# Patient Record
Sex: FEMALE | Race: WHITE | NOT HISPANIC OR LATINO | URBAN - METROPOLITAN AREA
[De-identification: names, ages, dates, MRNs, and addresses within clinical notes are randomized per-mention and may not be internally consistent; named-entity substitution may affect disease eponyms.]

---

## 2017-12-20 ENCOUNTER — APPOINTMENT (OUTPATIENT)
Dept: URBAN - METROPOLITAN AREA CLINIC 219 | Age: 77
Setting detail: DERMATOLOGY
End: 2017-12-21

## 2017-12-20 DIAGNOSIS — L81.7 PIGMENTED PURPURIC DERMATOSIS: ICD-10-CM

## 2017-12-20 DIAGNOSIS — L30.8 OTHER SPECIFIED DERMATITIS: ICD-10-CM

## 2017-12-20 DIAGNOSIS — L72.0 EPIDERMAL CYST: ICD-10-CM

## 2017-12-20 DIAGNOSIS — L82.0 INFLAMED SEBORRHEIC KERATOSIS: ICD-10-CM

## 2017-12-20 PROBLEM — M12.9 ARTHROPATHY, UNSPECIFIED: Status: ACTIVE | Noted: 2017-12-20

## 2017-12-20 PROCEDURE — OTHER COUNSELING: OTHER

## 2017-12-20 PROCEDURE — OTHER LIQUID NITROGEN: OTHER

## 2017-12-20 PROCEDURE — OTHER PRESCRIPTION: OTHER

## 2017-12-20 PROCEDURE — OTHER INCISION AND DRAINAGE: OTHER

## 2017-12-20 PROCEDURE — OTHER OTHER: OTHER

## 2017-12-20 PROCEDURE — 10061 I&D ABSCESS COMP/MULTIPLE: CPT

## 2017-12-20 PROCEDURE — OTHER TREATMENT REGIMEN: OTHER

## 2017-12-20 PROCEDURE — 17110 DESTRUCT B9 LESION 1-14: CPT

## 2017-12-20 PROCEDURE — 99214 OFFICE O/P EST MOD 30 MIN: CPT | Mod: 25

## 2017-12-20 RX ORDER — TRIAMCINOLONE ACETONIDE 1 MG/G
APPLY OINTMENT TOPICAL AS DIRECTED
Qty: 1 | Refills: 5 | Status: ERX

## 2017-12-20 RX ORDER — MUPIROCIN 20 MG/G
APPLY OINTMENT TOPICAL TWICE A DAY
Qty: 1 | Refills: 3 | Status: ERX | COMMUNITY
Start: 2017-12-20

## 2017-12-20 ASSESSMENT — LOCATION DETAILED DESCRIPTION DERM
LOCATION DETAILED: RIGHT ANTERIOR PROXIMAL THIGH
LOCATION DETAILED: LEFT DISTAL PRETIBIAL REGION
LOCATION DETAILED: RIGHT DISTAL PRETIBIAL REGION
LOCATION DETAILED: LEFT MID-UPPER BACK

## 2017-12-20 ASSESSMENT — LOCATION SIMPLE DESCRIPTION DERM
LOCATION SIMPLE: LEFT UPPER BACK
LOCATION SIMPLE: RIGHT PRETIBIAL REGION
LOCATION SIMPLE: LEFT PRETIBIAL REGION
LOCATION SIMPLE: RIGHT THIGH

## 2017-12-20 ASSESSMENT — LOCATION ZONE DERM
LOCATION ZONE: TRUNK
LOCATION ZONE: LEG

## 2017-12-20 NOTE — PROCEDURE: TREATMENT REGIMEN
Continue Regimen: Triamcinolone 0.1% Ointment 1-2 times a week as needed for flares\\nEmollients ( Cerave Cream)  \\nMild Cleansers
Plan: Triamcinolone 0.1%  Ointment 1-2 times a week as needed
Detail Level: Zone
Detail Level: Simple

## 2017-12-20 NOTE — PROCEDURE: INCISION AND DRAINAGE
Wound Care: Mupirocin
Include Sutures?: No
Epidermal Closure: simple interrupted
Size Of Lesion In Cm (Optional But May Be Required For Some Insurances): 2
I&D Type: complicated
Epidermal Sutures: 4-0 Ethilon
Dressing: dry sterile dressing
Body Location Override (Optional - Billing Will Still Be Based On Selected Body Map Location If Applicable): right thigh
Detail Level: Detailed
Lesion Type: Cyst
Anesthesia Type: 1% lidocaine with epinephrine and a 1:10 solution of 8.4% sodium bicarbonate
Suture Text: The incision was partially closed with
Curette: Yes
Post-Care Instructions: I reviewed with the patient in detail post-care instructions. Patient should keep wound covered and call the office should any redness, pain, swelling or worsening occur.
Consent was obtained and risks were reviewed including but not limited to delayed wound healing, infection, need for multiple I and D's, and pain.
Curette Text (Optional): After the contents were expressed a curette was used to partially remove the cyst wall.
Anesthesia Volume In Cc: 3
Method: 15 blade

## 2017-12-20 NOTE — PROCEDURE: OTHER
Detail Level: Simple
Other (Free Text): Patient to call for overbook appointment if worsens or no improvement
Note Text (......Xxx Chief Complaint.): This diagnosis correlates with the

## 2017-12-20 NOTE — PROCEDURE: LIQUID NITROGEN
Detail Level: Detailed
Post-Care Instructions: I reviewed with the patient in detail post-care instructions. Patient is to wear sunprotection, and avoid picking at any of the treated lesions. Pt may apply Vaseline to crusted or scabbing areas.
Medical Necessity Information: It is in your best interest to select a reason for this procedure from the list below. All of these items fulfill various CMS LCD requirements except the new and changing color options.
Consent: The patient's consent was obtained including but not limited to risks of crusting, scabbing, blistering, scarring, darker or lighter pigmentary change, recurrence, incomplete removal and infection.
Add 52 Modifier (Optional): no
Medical Necessity Clause: This procedure was medically necessary because the lesions that were treated were:

## 2018-06-26 ENCOUNTER — APPOINTMENT (OUTPATIENT)
Dept: URBAN - METROPOLITAN AREA CLINIC 219 | Age: 78
Setting detail: DERMATOLOGY
End: 2018-06-26

## 2018-06-26 DIAGNOSIS — L29.8 OTHER PRURITUS: ICD-10-CM

## 2018-06-26 DIAGNOSIS — L82.0 INFLAMED SEBORRHEIC KERATOSIS: ICD-10-CM

## 2018-06-26 DIAGNOSIS — L30.8 OTHER SPECIFIED DERMATITIS: ICD-10-CM

## 2018-06-26 DIAGNOSIS — L57.0 ACTINIC KERATOSIS: ICD-10-CM

## 2018-06-26 DIAGNOSIS — L82.1 OTHER SEBORRHEIC KERATOSIS: ICD-10-CM

## 2018-06-26 DIAGNOSIS — L81.7 PIGMENTED PURPURIC DERMATOSIS: ICD-10-CM

## 2018-06-26 PROCEDURE — OTHER MIPS QUALITY: OTHER

## 2018-06-26 PROCEDURE — OTHER REASSURANCE: OTHER

## 2018-06-26 PROCEDURE — 17003 DESTRUCT PREMALG LES 2-14: CPT

## 2018-06-26 PROCEDURE — 99214 OFFICE O/P EST MOD 30 MIN: CPT | Mod: 25

## 2018-06-26 PROCEDURE — 17110 DESTRUCT B9 LESION 1-14: CPT

## 2018-06-26 PROCEDURE — OTHER TREATMENT REGIMEN: OTHER

## 2018-06-26 PROCEDURE — 17000 DESTRUCT PREMALG LESION: CPT | Mod: 59

## 2018-06-26 PROCEDURE — OTHER LIQUID NITROGEN: OTHER

## 2018-06-26 ASSESSMENT — LOCATION DETAILED DESCRIPTION DERM
LOCATION DETAILED: RIGHT PROXIMAL POSTERIOR UPPER ARM
LOCATION DETAILED: LEFT MEDIAL DISTAL PRETIBIAL REGION
LOCATION DETAILED: LEFT DISTAL POSTERIOR UPPER ARM
LOCATION DETAILED: RIGHT MEDIAL UPPER BACK
LOCATION DETAILED: RIGHT DISTAL PRETIBIAL REGION
LOCATION DETAILED: LEFT DISTAL PRETIBIAL REGION

## 2018-06-26 ASSESSMENT — LOCATION SIMPLE DESCRIPTION DERM
LOCATION SIMPLE: RIGHT UPPER BACK
LOCATION SIMPLE: LEFT POSTERIOR UPPER ARM
LOCATION SIMPLE: LEFT PRETIBIAL REGION
LOCATION SIMPLE: RIGHT POSTERIOR UPPER ARM
LOCATION SIMPLE: RIGHT PRETIBIAL REGION

## 2018-06-26 ASSESSMENT — LOCATION ZONE DERM
LOCATION ZONE: TRUNK
LOCATION ZONE: LEG
LOCATION ZONE: ARM

## 2018-06-26 NOTE — PROCEDURE: LIQUID NITROGEN
Post-Care Instructions: I reviewed with the patient in detail post-care instructions. Patient is to wear sunprotection, and avoid picking at any of the treated lesions. Pt may apply Vaseline to crusted or scabbing areas.
Render Post-Care Instructions In Note?: no
Detail Level: Detailed
Medical Necessity Clause: This procedure was medically necessary because the lesions that were treated were:
Duration Of Freeze Thaw-Cycle (Seconds): 0
Consent: The patient's consent was obtained including but not limited to risks of crusting, scabbing, blistering, scarring, darker or lighter pigmentary change, recurrence, incomplete removal and infection.
Medical Necessity Information: It is in your best interest to select a reason for this procedure from the list below. All of these items fulfill various CMS LCD requirements except the new and changing color options.

## 2018-06-26 NOTE — PROCEDURE: TREATMENT REGIMEN
Continue Regimen: Triamcinolone  0.1%  Ointment 1-2 times a week as needed
Detail Level: Zone
Continue Regimen: Triamcinolone 0.1% Ointment 1-2 times a week as needed for flares\\nEmollients ( Cerave Cream)  \\nMild Cleansers
Detail Level: Simple
Plan: Selsun Blue Moisturizing shampoo with aloe 2-3 times a week

## 2018-10-02 ENCOUNTER — APPOINTMENT (OUTPATIENT)
Dept: URBAN - METROPOLITAN AREA CLINIC 219 | Age: 78
Setting detail: DERMATOLOGY
End: 2018-10-02

## 2018-10-02 DIAGNOSIS — L82.1 OTHER SEBORRHEIC KERATOSIS: ICD-10-CM

## 2018-10-02 DIAGNOSIS — L72.8 OTHER FOLLICULAR CYSTS OF THE SKIN AND SUBCUTANEOUS TISSUE: ICD-10-CM

## 2018-10-02 DIAGNOSIS — L82.0 INFLAMED SEBORRHEIC KERATOSIS: ICD-10-CM

## 2018-10-02 PROCEDURE — 17110 DESTRUCT B9 LESION 1-14: CPT

## 2018-10-02 PROCEDURE — 10060 I&D ABSCESS SIMPLE/SINGLE: CPT

## 2018-10-02 PROCEDURE — OTHER INCISION AND DRAINAGE: OTHER

## 2018-10-02 PROCEDURE — 99212 OFFICE O/P EST SF 10 MIN: CPT | Mod: 25

## 2018-10-02 PROCEDURE — OTHER REASSURANCE: OTHER

## 2018-10-02 PROCEDURE — OTHER LIQUID NITROGEN: OTHER

## 2018-10-02 ASSESSMENT — LOCATION DETAILED DESCRIPTION DERM
LOCATION DETAILED: RIGHT SUPERIOR LATERAL MALAR CHEEK
LOCATION DETAILED: RIGHT MEDIAL SUPERIOR CHEST
LOCATION DETAILED: LEFT CHIN
LOCATION DETAILED: SUPERIOR THORACIC SPINE
LOCATION DETAILED: LEFT LATERAL SUPERIOR CHEST
LOCATION DETAILED: RIGHT CLAVICULAR SKIN
LOCATION DETAILED: RIGHT ANTERIOR SHOULDER
LOCATION DETAILED: RIGHT LATERAL TEMPLE
LOCATION DETAILED: LEFT SUPERIOR POSTAURICULAR SKIN
LOCATION DETAILED: LEFT MEDIAL SUPERIOR CHEST
LOCATION DETAILED: RIGHT LATERAL SUPERIOR CHEST
LOCATION DETAILED: RIGHT CLAVICULAR NECK

## 2018-10-02 ASSESSMENT — LOCATION SIMPLE DESCRIPTION DERM
LOCATION SIMPLE: RIGHT ANTERIOR NECK
LOCATION SIMPLE: RIGHT CHEEK
LOCATION SIMPLE: SCALP
LOCATION SIMPLE: UPPER BACK
LOCATION SIMPLE: RIGHT TEMPLE
LOCATION SIMPLE: RIGHT SHOULDER
LOCATION SIMPLE: CHEST
LOCATION SIMPLE: CHIN
LOCATION SIMPLE: RIGHT CLAVICULAR SKIN

## 2018-10-02 ASSESSMENT — LOCATION ZONE DERM
LOCATION ZONE: SCALP
LOCATION ZONE: FACE
LOCATION ZONE: TRUNK
LOCATION ZONE: NECK
LOCATION ZONE: ARM

## 2018-10-02 NOTE — PROCEDURE: LIQUID NITROGEN
Medical Necessity Clause: This procedure was medically necessary because the lesions that were treated were:
Post-Care Instructions: I reviewed with the patient in detail post-care instructions. Patient is to wear sunprotection, and avoid picking at any of the treated lesions. Pt may apply Vaseline to crusted or scabbing areas.
Render Post-Care Instructions In Note?: no
Detail Level: Detailed
Medical Necessity Information: It is in your best interest to select a reason for this procedure from the list below. All of these items fulfill various CMS LCD requirements except the new and changing color options.
Consent: The patient's consent was obtained including but not limited to risks of crusting, scabbing, blistering, scarring, darker or lighter pigmentary change, recurrence, incomplete removal and infection.

## 2018-10-02 NOTE — PROCEDURE: INCISION AND DRAINAGE
Consent was obtained and risks were reviewed including but not limited to delayed wound healing, infection, need for multiple I and D's, and pain.
Suture Text: The incision was partially closed with
Wound Care: Mupirocin
Method: 11 blade
Include Sutures?: No
Anesthesia Volume In Cc: 0.5
Size Of Lesion In Cm (Optional But May Be Required For Some Insurances): 0.3
Preparation Text: The area was prepped in the usual clean fashion.
Body Location Override (Optional - Billing Will Still Be Based On Selected Body Map Location If Applicable): right cheek
Post-Care Instructions: I reviewed with the patient in detail post-care instructions. Patient should keep wound covered and call the office should any redness, pain, swelling or worsening occur.
Body Location Override (Optional - Billing Will Still Be Based On Selected Body Map Location If Applicable): left chin
Detail Level: Detailed
Dressing: no dressing
Epidermal Closure: simple interrupted
Epidermal Sutures: 4-0 Ethilon
Curette Text (Optional): After the contents were expressed a curette was used to partially remove the cyst wall.
Lesion Type: Cyst

## 2018-10-23 ENCOUNTER — APPOINTMENT (OUTPATIENT)
Dept: URBAN - METROPOLITAN AREA CLINIC 219 | Age: 78
Setting detail: DERMATOLOGY
End: 2018-10-23

## 2018-10-23 DIAGNOSIS — L81.7 PIGMENTED PURPURIC DERMATOSIS: ICD-10-CM

## 2018-10-23 PROCEDURE — OTHER TREATMENT REGIMEN: OTHER

## 2018-10-23 PROCEDURE — 99212 OFFICE O/P EST SF 10 MIN: CPT

## 2018-10-23 PROCEDURE — OTHER COUNSELING: OTHER

## 2018-10-23 PROCEDURE — OTHER MIPS QUALITY: OTHER

## 2018-10-23 ASSESSMENT — LOCATION ZONE DERM: LOCATION ZONE: LEG

## 2018-10-23 ASSESSMENT — LOCATION SIMPLE DESCRIPTION DERM: LOCATION SIMPLE: RIGHT PRETIBIAL REGION

## 2018-10-23 ASSESSMENT — LOCATION DETAILED DESCRIPTION DERM: LOCATION DETAILED: RIGHT DISTAL PRETIBIAL REGION

## 2018-10-23 NOTE — PROCEDURE: TREATMENT REGIMEN
Plan: Complete the Keflex course ( patient currently taking a 7 day course ) \\nCan cont Compression stockings
Detail Level: Simple

## 2019-01-03 ENCOUNTER — APPOINTMENT (OUTPATIENT)
Dept: URBAN - METROPOLITAN AREA CLINIC 219 | Age: 79
Setting detail: DERMATOLOGY
End: 2019-01-03

## 2019-01-03 DIAGNOSIS — L72.0 EPIDERMAL CYST: ICD-10-CM

## 2019-01-03 DIAGNOSIS — L57.0 ACTINIC KERATOSIS: ICD-10-CM

## 2019-01-03 PROBLEM — L85.3 XEROSIS CUTIS: Status: ACTIVE | Noted: 2019-01-03

## 2019-01-03 PROBLEM — E78.5 HYPERLIPIDEMIA, UNSPECIFIED: Status: ACTIVE | Noted: 2019-01-03

## 2019-01-03 PROCEDURE — 99212 OFFICE O/P EST SF 10 MIN: CPT | Mod: 25

## 2019-01-03 PROCEDURE — 17003 DESTRUCT PREMALG LES 2-14: CPT

## 2019-01-03 PROCEDURE — 17000 DESTRUCT PREMALG LESION: CPT

## 2019-01-03 PROCEDURE — OTHER COUNSELING: OTHER

## 2019-01-03 PROCEDURE — OTHER LIQUID NITROGEN: OTHER

## 2019-01-03 ASSESSMENT — LOCATION ZONE DERM
LOCATION ZONE: NECK
LOCATION ZONE: FACE

## 2019-01-03 ASSESSMENT — LOCATION SIMPLE DESCRIPTION DERM
LOCATION SIMPLE: LEFT FOREHEAD
LOCATION SIMPLE: NECK
LOCATION SIMPLE: POSTERIOR NECK

## 2019-01-03 ASSESSMENT — LOCATION DETAILED DESCRIPTION DERM
LOCATION DETAILED: LEFT LATERAL TRAPEZIAL NECK
LOCATION DETAILED: RIGHT MEDIAL TRAPEZIAL NECK
LOCATION DETAILED: RIGHT SUPERIOR POSTERIOR NECK
LOCATION DETAILED: LEFT MEDIAL TRAPEZIAL NECK
LOCATION DETAILED: LEFT INFERIOR POSTERIOR NECK
LOCATION DETAILED: RIGHT CENTRAL POSTERIOR NECK
LOCATION DETAILED: LEFT INFERIOR FOREHEAD

## 2019-01-03 NOTE — HPI: OTHER
Condition:: Would like her face checked
Please Describe Your Condition:: Patient states she would like her face checked today.

## 2020-04-07 ENCOUNTER — APPOINTMENT (OUTPATIENT)
Dept: URBAN - NONMETROPOLITAN AREA CLINIC 45 | Age: 80
Setting detail: DERMATOLOGY
End: 2020-04-07

## 2020-04-07 DIAGNOSIS — I87.2 VENOUS INSUFFICIENCY (CHRONIC) (PERIPHERAL): ICD-10-CM

## 2020-04-07 PROBLEM — I48.91 UNSPECIFIED ATRIAL FIBRILLATION: Status: ACTIVE | Noted: 2020-04-07

## 2020-04-07 PROBLEM — L29.8 OTHER PRURITUS: Status: ACTIVE | Noted: 2020-04-07

## 2020-04-07 PROCEDURE — OTHER TREATMENT REGIMEN: OTHER

## 2020-04-07 PROCEDURE — OTHER COUNSELING: OTHER

## 2020-04-07 PROCEDURE — 99213 OFFICE O/P EST LOW 20 MIN: CPT | Mod: 95

## 2020-04-07 PROCEDURE — OTHER MIPS QUALITY: OTHER

## 2020-04-07 PROCEDURE — OTHER REASON FOR TELEMEDICINE VISIT: OTHER

## 2020-04-07 ASSESSMENT — LOCATION DETAILED DESCRIPTION DERM: LOCATION DETAILED: RIGHT DISTAL PRETIBIAL REGION

## 2020-04-07 ASSESSMENT — LOCATION ZONE DERM: LOCATION ZONE: LEG

## 2020-04-07 ASSESSMENT — LOCATION SIMPLE DESCRIPTION DERM: LOCATION SIMPLE: RIGHT PRETIBIAL REGION

## 2020-04-07 NOTE — PROCEDURE: TREATMENT REGIMEN
Detail Level: Simple
Plan: Recommend compression hose daily \\nPatient to keep legs elevated while seated \\nWash with Dove bar soap\\nTriamcinolone ointment twice a day x2 weeks then 1-2 times a week as needed then change to \\nCetaphil or Cerave cream as moisturizer

## 2020-06-23 ENCOUNTER — OFFICE VISIT (OUTPATIENT)
Dept: URBAN - NONMETROPOLITAN AREA CLINIC 2 | Facility: CLINIC | Age: 80
End: 2020-06-23
Payer: COMMERCIAL

## 2020-06-23 DIAGNOSIS — Z12.11 COLON CANCER SCREENING: ICD-10-CM

## 2020-06-23 DIAGNOSIS — R19.7 DIARRHEA: ICD-10-CM

## 2020-06-23 DIAGNOSIS — R14.0 BLOATING: ICD-10-CM

## 2020-06-23 PROCEDURE — G8417 CALC BMI ABV UP PARAM F/U: HCPCS | Performed by: INTERNAL MEDICINE

## 2020-06-23 PROCEDURE — 99214 OFFICE O/P EST MOD 30 MIN: CPT | Performed by: INTERNAL MEDICINE

## 2020-06-23 PROCEDURE — G9903 PT SCRN TBCO ID AS NON USER: HCPCS | Performed by: INTERNAL MEDICINE

## 2020-06-23 PROCEDURE — G8427 DOCREV CUR MEDS BY ELIG CLIN: HCPCS | Performed by: INTERNAL MEDICINE

## 2020-06-23 RX ORDER — SIMVASTATIN 10 MG/1
TAKE 1 TABLET (10 MG) BY ORAL ROUTE ONCE DAILY IN THE EVENING TABLET, FILM COATED ORAL 1
Qty: 0 | Refills: 0 | Status: ACTIVE | COMMUNITY
Start: 1900-01-01

## 2020-06-23 RX ORDER — RIFAXIMIN 550 MG/1
1 TABLET TABLET ORAL THREE TIMES A DAY
Qty: 42 TABLET | Refills: 3 | OUTPATIENT
Start: 2020-06-23 | End: 2020-08-18

## 2020-06-23 RX ORDER — LOSARTAN POTASSIUM 25 MG/1
TAKE 1 TABLET (25 MG) BY ORAL ROUTE ONCE DAILY TABLET, FILM COATED ORAL 1
Qty: 0 | Refills: 0 | Status: ACTIVE | COMMUNITY
Start: 1900-01-01

## 2020-06-23 RX ORDER — TRIAMTERENE AND HYDROCHLOROTHIAZIDE 37.5; 25 MG/1; MG/1
TABLET ORAL
Qty: 0 | Refills: 0 | Status: ACTIVE | COMMUNITY
Start: 1900-01-01

## 2020-06-23 RX ORDER — WARFARIN 5 MG/1
TABLET ORAL
Qty: 0 | Refills: 0 | Status: ACTIVE | COMMUNITY
Start: 1900-01-01

## 2020-06-23 RX ORDER — SITAGLIPTIN AND METFORMIN HYDROCHLORIDE 50; 1000 MG/1; MG/1
TAKE 1 TABLET BY ORAL ROUTE 2 TIMES PER DAY WITH MEALS TABLET, FILM COATED ORAL 2
Qty: 0 | Refills: 0 | Status: ACTIVE | COMMUNITY
Start: 1900-01-01

## 2020-06-23 RX ORDER — OXYBUTYNIN CHLORIDE 10 MG/1
TAKE 1 TABLET (10 MG) BY ORAL ROUTE ONCE DAILY TABLET, EXTENDED RELEASE ORAL 1
Qty: 0 | Refills: 0 | Status: ACTIVE | COMMUNITY
Start: 1900-01-01

## 2020-06-23 RX ORDER — DILTIAZEM HYDROCHLORIDE 120 MG/1
TAKE 1 CAPSULE (120 MG) BY ORAL ROUTE ONCE DAILY CAPSULE, EXTENDED RELEASE ORAL 1
Qty: 0 | Refills: 0 | Status: ACTIVE | COMMUNITY
Start: 1900-01-01

## 2020-06-23 RX ORDER — ATENOLOL 50 MG/1
TAKE 1 TABLET (50 MG) BY ORAL ROUTE ONCE DAILY TABLET ORAL 1
Qty: 0 | Refills: 0 | Status: ACTIVE | COMMUNITY
Start: 1900-01-01

## 2020-06-23 RX ORDER — BIFIDOBACTERIUM LONGUM 10MM CELL
TAKE 1 CAPSULE BY ORAL ROUTE DAILY CAPSULE ORAL 1
Qty: 0 | Refills: 0 | Status: ACTIVE | COMMUNITY
Start: 1900-01-01

## 2020-06-23 NOTE — HPI-TODAY'S VISIT:
12/19/19  Mrs. Pleitez presents for endoscopy follow up. Her labs show elevated celiac titer. Her EGD shows normal SBBX and her colonoscopy reveals normal random biopsies and diverticular disease. She is on low dose metamucil and align daily, she thinks this works better than the florastor. She still has urgency and diarrhea with bloating. She has failed immodium in the past. She does want to try a gluten free diet given her history. Today she is doing well otherwise. MB   6/23/20 The patient presents today for follow-up of her diarrhea and urgency.  This has been much better on a gluten-free diet.  Debra did treat her with Xifaxan at her last visit.  Her main GI complaint today is of increased gas and bloating.  She denies any artificial sweeteners.  She does take a probiotic daily.  She does not feel that she is constipated.  She is unsure if the Xifaxan helped significantly with the gas and bloating, but it has gotten worse since her last visit.  Overall, she is feeling well today.

## 2020-07-02 ENCOUNTER — APPOINTMENT (OUTPATIENT)
Dept: URBAN - NONMETROPOLITAN AREA CLINIC 45 | Age: 80
Setting detail: DERMATOLOGY
End: 2020-07-02

## 2020-07-02 DIAGNOSIS — L57.8 OTHER SKIN CHANGES DUE TO CHRONIC EXPOSURE TO NONIONIZING RADIATION: ICD-10-CM

## 2020-07-02 DIAGNOSIS — H01.00 UNSPECIFIED BLEPHARITIS: ICD-10-CM

## 2020-07-02 DIAGNOSIS — L82.1 OTHER SEBORRHEIC KERATOSIS: ICD-10-CM

## 2020-07-02 PROBLEM — D48.5 NEOPLASM OF UNCERTAIN BEHAVIOR OF SKIN: Status: ACTIVE | Noted: 2020-07-02

## 2020-07-02 PROBLEM — L85.3 XEROSIS CUTIS: Status: ACTIVE | Noted: 2020-07-02

## 2020-07-02 PROBLEM — H01.001 UNSPECIFIED BLEPHARITIS RIGHT UPPER EYELID: Status: ACTIVE | Noted: 2020-07-02

## 2020-07-02 PROBLEM — E13.9 OTHER SPECIFIED DIABETES MELLITUS WITHOUT COMPLICATIONS: Status: ACTIVE | Noted: 2020-07-02

## 2020-07-02 PROBLEM — M12.9 ARTHROPATHY, UNSPECIFIED: Status: ACTIVE | Noted: 2020-07-02

## 2020-07-02 PROBLEM — H01.004 UNSPECIFIED BLEPHARITIS LEFT UPPER EYELID: Status: ACTIVE | Noted: 2020-07-02

## 2020-07-02 PROCEDURE — OTHER REASSURANCE: OTHER

## 2020-07-02 PROCEDURE — OTHER COUNSELING: OTHER

## 2020-07-02 PROCEDURE — OTHER TREATMENT REGIMEN: OTHER

## 2020-07-02 PROCEDURE — 99213 OFFICE O/P EST LOW 20 MIN: CPT

## 2020-07-02 ASSESSMENT — LOCATION ZONE DERM
LOCATION ZONE: NOSE
LOCATION ZONE: EYELID
LOCATION ZONE: FACE

## 2020-07-02 ASSESSMENT — LOCATION SIMPLE DESCRIPTION DERM
LOCATION SIMPLE: RIGHT SUPERIOR EYELID
LOCATION SIMPLE: LEFT CHEEK
LOCATION SIMPLE: NOSE
LOCATION SIMPLE: LEFT SUPERIOR EYELID

## 2020-07-02 ASSESSMENT — LOCATION DETAILED DESCRIPTION DERM
LOCATION DETAILED: LEFT INFERIOR CENTRAL MALAR CHEEK
LOCATION DETAILED: RIGHT LATERAL SUPERIOR EYELID
LOCATION DETAILED: NASAL DORSUM
LOCATION DETAILED: LEFT LATERAL SUPERIOR EYELID
LOCATION DETAILED: LEFT LATERAL MALAR CHEEK

## 2020-07-02 NOTE — PROCEDURE: TREATMENT REGIMEN
Plan: Clean eyelids twice a day with diluted baby shampoo
Detail Level: Simple
Plan: Murad Rapid Age Spot and Pigment Lightening Serum every morning to dark spots as needed (Patient already has)\\nNeutrogena Rapid Wrinkle Repair Regenerating Cream at night as tolerated (Patient already has)\\nSunscreen daily

## 2020-07-02 NOTE — HPI: OTHER
Condition:: Scaling upper and lower lash line
Please Describe Your Condition:: is being seen for a chief complaint of Scaling upper and lower lash line . Denies any irritation

## 2020-07-15 ENCOUNTER — TELEPHONE ENCOUNTER (OUTPATIENT)
Dept: URBAN - NONMETROPOLITAN AREA CLINIC 2 | Facility: CLINIC | Age: 80
End: 2020-07-15

## 2020-12-11 ENCOUNTER — APPOINTMENT (OUTPATIENT)
Dept: URBAN - METROPOLITAN AREA CLINIC 219 | Age: 80
Setting detail: DERMATOLOGY
End: 2020-12-11

## 2020-12-11 DIAGNOSIS — H01.00 UNSPECIFIED BLEPHARITIS: ICD-10-CM

## 2020-12-11 DIAGNOSIS — L21.8 OTHER SEBORRHEIC DERMATITIS: ICD-10-CM

## 2020-12-11 DIAGNOSIS — L57.0 ACTINIC KERATOSIS: ICD-10-CM

## 2020-12-11 DIAGNOSIS — L72.8 OTHER FOLLICULAR CYSTS OF THE SKIN AND SUBCUTANEOUS TISSUE: ICD-10-CM

## 2020-12-11 PROBLEM — E13.9 OTHER SPECIFIED DIABETES MELLITUS WITHOUT COMPLICATIONS: Status: ACTIVE | Noted: 2020-12-11

## 2020-12-11 PROBLEM — H01.001 UNSPECIFIED BLEPHARITIS RIGHT UPPER EYELID: Status: ACTIVE | Noted: 2020-12-11

## 2020-12-11 PROBLEM — H01.002 UNSPECIFIED BLEPHARITIS RIGHT LOWER EYELID: Status: ACTIVE | Noted: 2020-12-11

## 2020-12-11 PROBLEM — H01.004 UNSPECIFIED BLEPHARITIS LEFT UPPER EYELID: Status: ACTIVE | Noted: 2020-12-11

## 2020-12-11 PROBLEM — E78.5 HYPERLIPIDEMIA, UNSPECIFIED: Status: ACTIVE | Noted: 2020-12-11

## 2020-12-11 PROBLEM — H01.005 UNSPECIFIED BLEPHARITIS LEFT LOWER EYELID: Status: ACTIVE | Noted: 2020-12-11

## 2020-12-11 PROCEDURE — OTHER PRESCRIPTION: OTHER

## 2020-12-11 PROCEDURE — 17000 DESTRUCT PREMALG LESION: CPT

## 2020-12-11 PROCEDURE — OTHER LIQUID NITROGEN: OTHER

## 2020-12-11 PROCEDURE — 99213 OFFICE O/P EST LOW 20 MIN: CPT | Mod: 25

## 2020-12-11 PROCEDURE — OTHER TREATMENT REGIMEN: OTHER

## 2020-12-11 PROCEDURE — OTHER INCISION AND DRAINAGE: OTHER

## 2020-12-11 PROCEDURE — 10061 I&D ABSCESS COMP/MULTIPLE: CPT

## 2020-12-11 PROCEDURE — OTHER COUNSELING: OTHER

## 2020-12-11 RX ORDER — CLOBETASOL PROPIONATE 0.5 MG/ML
SOLUTION TOPICAL AS DIRECTED
Qty: 1 | Refills: 5 | Status: ERX | COMMUNITY
Start: 2020-12-11

## 2020-12-11 ASSESSMENT — LOCATION ZONE DERM
LOCATION ZONE: LIP
LOCATION ZONE: EYELID
LOCATION ZONE: FACE

## 2020-12-11 ASSESSMENT — LOCATION DETAILED DESCRIPTION DERM
LOCATION DETAILED: LEFT MEDIAL INFERIOR PRESEPTAL REGION
LOCATION DETAILED: LEFT LOWER CUTANEOUS LIP
LOCATION DETAILED: RIGHT LATERAL SUPERIOR EYELID
LOCATION DETAILED: RIGHT LATERAL INFERIOR EYELID
LOCATION DETAILED: LEFT LATERAL SUPERIOR EYELID
LOCATION DETAILED: RIGHT INFERIOR FOREHEAD
LOCATION DETAILED: LEFT MEDIAL SUPERIOR EYELID
LOCATION DETAILED: RIGHT MEDIAL SUPERIOR EYELID
LOCATION DETAILED: RIGHT LOWER CUTANEOUS LIP

## 2020-12-11 ASSESSMENT — LOCATION SIMPLE DESCRIPTION DERM
LOCATION SIMPLE: LEFT INFERIOR EYELID
LOCATION SIMPLE: RIGHT INFERIOR EYELID
LOCATION SIMPLE: LEFT LIP
LOCATION SIMPLE: RIGHT LIP
LOCATION SIMPLE: RIGHT SUPERIOR EYELID
LOCATION SIMPLE: LEFT SUPERIOR EYELID
LOCATION SIMPLE: RIGHT FOREHEAD

## 2020-12-11 NOTE — PROCEDURE: LIQUID NITROGEN
Detail Level: Detailed
Render Note In Bullet Format When Appropriate: No
Number Of Freeze-Thaw Cycles: 1 freeze-thaw cycle
Duration Of Freeze Thaw-Cycle (Seconds): 0
Consent: The patient's consent was obtained including but not limited to risks of crusting, scabbing, blistering, scarring, darker or lighter pigmentary change, recurrence, incomplete removal and infection.
Post-Care Instructions: I reviewed with the patient in detail post-care instructions. Patient is to wear sunprotection, and avoid picking at any of the treated lesions. Pt may apply Vaseline to crusted or scabbing areas.

## 2020-12-11 NOTE — PROCEDURE: INCISION AND DRAINAGE
Include Sutures?: No
Anesthesia Volume In Cc: 0
I&D: multiple
Curette Text (Optional): After the contents were expressed a curette was used to partially remove the cyst wall.
Consent was obtained and risks were reviewed including but not limited to delayed wound healing, infection, need for multiple I and D's, and pain.
Dressing: dry sterile dressing
Epidermal Sutures: 4-0 Ethilon
Post-Care Instructions: I reviewed with the patient in detail post-care instructions. Patient should keep wound covered and call the office should any redness, pain, swelling or worsening occur.
Suture Text: The incision was partially closed with
Size Of Lesion In Cm (Optional But May Be Required For Some Insurances): 0.3
Epidermal Closure: simple interrupted
Wound Care: Petrolatum
Method: 11 blade
Detail Level: Detailed

## 2020-12-11 NOTE — PROCEDURE: TREATMENT REGIMEN
Detail Level: Simple
Plan: Selsun blue moisturizing shampoo with aloe\\nClobetasol solution twice a day x1 week then 1-2 times a week  as needed
Plan: Clean eyelids twice a day with diluted baby shampoo\\nWarm compresses twice a day \\nPatient declines topical antibiotic or steroid ointment at this time

## 2020-12-15 ENCOUNTER — OFFICE VISIT (OUTPATIENT)
Dept: URBAN - NONMETROPOLITAN AREA CLINIC 2 | Facility: CLINIC | Age: 80
End: 2020-12-15

## 2021-03-23 ENCOUNTER — OFFICE VISIT (OUTPATIENT)
Dept: URBAN - NONMETROPOLITAN AREA CLINIC 2 | Facility: CLINIC | Age: 81
End: 2021-03-23

## 2021-09-23 ENCOUNTER — APPOINTMENT (OUTPATIENT)
Dept: URBAN - NONMETROPOLITAN AREA CLINIC 45 | Age: 81
Setting detail: DERMATOLOGY
End: 2021-09-23

## 2021-09-23 DIAGNOSIS — H01.00 UNSPECIFIED BLEPHARITIS: ICD-10-CM

## 2021-09-23 DIAGNOSIS — L72.0 EPIDERMAL CYST: ICD-10-CM

## 2021-09-23 DIAGNOSIS — L82.0 INFLAMED SEBORRHEIC KERATOSIS: ICD-10-CM

## 2021-09-23 DIAGNOSIS — L21.8 OTHER SEBORRHEIC DERMATITIS: ICD-10-CM

## 2021-09-23 PROBLEM — H01.001 UNSPECIFIED BLEPHARITIS RIGHT UPPER EYELID: Status: ACTIVE | Noted: 2021-09-23

## 2021-09-23 PROBLEM — H01.005 UNSPECIFIED BLEPHARITIS LEFT LOWER EYELID: Status: ACTIVE | Noted: 2021-09-23

## 2021-09-23 PROBLEM — H01.002 UNSPECIFIED BLEPHARITIS RIGHT LOWER EYELID: Status: ACTIVE | Noted: 2021-09-23

## 2021-09-23 PROBLEM — H01.004 UNSPECIFIED BLEPHARITIS LEFT UPPER EYELID: Status: ACTIVE | Noted: 2021-09-23

## 2021-09-23 PROCEDURE — OTHER LIQUID NITROGEN: OTHER

## 2021-09-23 PROCEDURE — OTHER COUNSELING: OTHER

## 2021-09-23 PROCEDURE — 17110 DESTRUCT B9 LESION 1-14: CPT

## 2021-09-23 PROCEDURE — OTHER TREATMENT REGIMEN: OTHER

## 2021-09-23 PROCEDURE — OTHER MIPS QUALITY: OTHER

## 2021-09-23 PROCEDURE — 99213 OFFICE O/P EST LOW 20 MIN: CPT | Mod: 25

## 2021-09-23 ASSESSMENT — LOCATION DETAILED DESCRIPTION DERM
LOCATION DETAILED: RIGHT LATERAL SUPERIOR EYELID
LOCATION DETAILED: LEFT MEDIAL SUPERIOR EYELID
LOCATION DETAILED: RIGHT LATERAL INFERIOR EYELID
LOCATION DETAILED: RIGHT MEDIAL SUPERIOR EYELID
LOCATION DETAILED: LEFT MEDIAL INFERIOR PRESEPTAL REGION
LOCATION DETAILED: RIGHT CENTRAL FRONTAL SCALP
LOCATION DETAILED: LEFT LATERAL SUPERIOR EYELID
LOCATION DETAILED: INFERIOR THORACIC SPINE
LOCATION DETAILED: RIGHT INFERIOR CENTRAL MALAR CHEEK
LOCATION DETAILED: LEFT LATERAL SUPERIOR CHEST
LOCATION DETAILED: RIGHT MID-UPPER BACK

## 2021-09-23 ASSESSMENT — LOCATION ZONE DERM
LOCATION ZONE: EYELID
LOCATION ZONE: FACE
LOCATION ZONE: TRUNK
LOCATION ZONE: SCALP

## 2021-09-23 ASSESSMENT — LOCATION SIMPLE DESCRIPTION DERM
LOCATION SIMPLE: LEFT INFERIOR EYELID
LOCATION SIMPLE: RIGHT SCALP
LOCATION SIMPLE: UPPER BACK
LOCATION SIMPLE: RIGHT CHEEK
LOCATION SIMPLE: CHEST
LOCATION SIMPLE: LEFT SUPERIOR EYELID
LOCATION SIMPLE: RIGHT SUPERIOR EYELID
LOCATION SIMPLE: RIGHT UPPER BACK
LOCATION SIMPLE: RIGHT INFERIOR EYELID

## 2021-09-23 NOTE — PROCEDURE: LIQUID NITROGEN
Consent: The patient's consent was obtained including but not limited to risks of crusting, scabbing, blistering, scarring, darker or lighter pigmentary change, recurrence, incomplete removal and infection.
Include Z78.9 (Other Specified Conditions Influencing Health Status) As An Associated Diagnosis?: No
Show Aperture Variable?: Yes
Medical Necessity Clause: This procedure was medically necessary because the lesions that were treated were:
Post-Care Instructions: I reviewed with the patient in detail post-care instructions. Patient is to wear sunprotection, and avoid picking at any of the treated lesions. Pt may apply Vaseline to crusted or scabbing areas.
Detail Level: Detailed
Medical Necessity Information: It is in your best interest to select a reason for this procedure from the list below. All of these items fulfill various CMS LCD requirements except the new and changing color options.

## 2021-09-23 NOTE — PROCEDURE: TREATMENT REGIMEN
Continue Regimen: Selsun blue moisturizing shampoo with aloe\\nClobetasol solution 1-2 times a week  as needed
Plan: Schedule appointment with Tracey for Acne surgery
Continue Regimen: Clean eyelids twice a day with diluted baby shampoo\\nWarm compresses twice a day
Detail Level: Simple
Detail Level: Zone

## 2021-11-19 ENCOUNTER — OFFICE VISIT (OUTPATIENT)
Dept: URBAN - NONMETROPOLITAN AREA CLINIC 13 | Facility: CLINIC | Age: 81
End: 2021-11-19
Payer: COMMERCIAL

## 2021-11-19 ENCOUNTER — WEB ENCOUNTER (OUTPATIENT)
Dept: URBAN - NONMETROPOLITAN AREA CLINIC 13 | Facility: CLINIC | Age: 81
End: 2021-11-19

## 2021-11-19 VITALS
SYSTOLIC BLOOD PRESSURE: 160 MMHG | HEIGHT: 65 IN | BODY MASS INDEX: 24.32 KG/M2 | WEIGHT: 146 LBS | DIASTOLIC BLOOD PRESSURE: 66 MMHG | HEART RATE: 67 BPM

## 2021-11-19 DIAGNOSIS — R14.0 BLOATING: ICD-10-CM

## 2021-11-19 DIAGNOSIS — R19.7 DIARRHEA: ICD-10-CM

## 2021-11-19 DIAGNOSIS — Z12.11 COLON CANCER SCREENING: ICD-10-CM

## 2021-11-19 PROCEDURE — 99214 OFFICE O/P EST MOD 30 MIN: CPT | Performed by: INTERNAL MEDICINE

## 2021-11-19 RX ORDER — WARFARIN 5 MG/1
TABLET ORAL
Qty: 0 | Refills: 0 | Status: ACTIVE | COMMUNITY
Start: 1900-01-01

## 2021-11-19 RX ORDER — SITAGLIPTIN AND METFORMIN HYDROCHLORIDE 50; 1000 MG/1; MG/1
TAKE 1 TABLET BY ORAL ROUTE 2 TIMES PER DAY WITH MEALS TABLET, FILM COATED ORAL 2
Qty: 0 | Refills: 0 | Status: ACTIVE | COMMUNITY
Start: 1900-01-01

## 2021-11-19 RX ORDER — OXYBUTYNIN CHLORIDE 10 MG/1
TAKE 1 TABLET (10 MG) BY ORAL ROUTE ONCE DAILY TABLET, EXTENDED RELEASE ORAL 1
Qty: 0 | Refills: 0 | Status: ACTIVE | COMMUNITY
Start: 1900-01-01

## 2021-11-19 RX ORDER — BIFIDOBACTERIUM LONGUM 10MM CELL
TAKE 1 CAPSULE BY ORAL ROUTE DAILY CAPSULE ORAL 1
Qty: 0 | Refills: 0 | Status: ACTIVE | COMMUNITY
Start: 1900-01-01

## 2021-11-19 RX ORDER — LOSARTAN POTASSIUM 25 MG/1
TAKE 1 TABLET (25 MG) BY ORAL ROUTE ONCE DAILY TABLET, FILM COATED ORAL 1
Qty: 0 | Refills: 0 | Status: ACTIVE | COMMUNITY
Start: 1900-01-01

## 2021-11-19 RX ORDER — ATENOLOL 50 MG/1
TAKE 1 TABLET (50 MG) BY ORAL ROUTE ONCE DAILY TABLET ORAL 1
Qty: 0 | Refills: 0 | Status: ACTIVE | COMMUNITY
Start: 1900-01-01

## 2021-11-19 RX ORDER — DILTIAZEM HYDROCHLORIDE 120 MG/1
TAKE 1 CAPSULE (120 MG) BY ORAL ROUTE ONCE DAILY CAPSULE, EXTENDED RELEASE ORAL 1
Qty: 0 | Refills: 0 | Status: ACTIVE | COMMUNITY
Start: 1900-01-01

## 2021-11-19 RX ORDER — TRIAMTERENE AND HYDROCHLOROTHIAZIDE 37.5; 25 MG/1; MG/1
TABLET ORAL
Qty: 0 | Refills: 0 | Status: ACTIVE | COMMUNITY
Start: 1900-01-01

## 2021-11-19 RX ORDER — COLESTIPOL HYDROCHLORIDE 1 G/1
1 TABLET TABLET, FILM COATED ORAL TWICE A DAY
Qty: 60 TABLET | Refills: 6 | OUTPATIENT
Start: 2021-11-19

## 2021-11-19 RX ORDER — SIMVASTATIN 10 MG/1
TAKE 1 TABLET (10 MG) BY ORAL ROUTE ONCE DAILY IN THE EVENING TABLET, FILM COATED ORAL 1
Qty: 0 | Refills: 0 | Status: ACTIVE | COMMUNITY
Start: 1900-01-01

## 2021-11-19 NOTE — HPI-TODAY'S VISIT:
12/19/19  Mrs. Pleitez presents for endoscopy follow up. Her labs show elevated celiac titer. Her EGD shows normal SBBX and her colonoscopy reveals normal random biopsies and diverticular disease. She is on low dose metamucil and align daily, she thinks this works better than the florastor. She still has urgency and diarrhea with bloating. She has failed immodium in the past. She does want to try a gluten free diet given her history. Today she is doing well otherwise. MB   6/23/20 The patient presents today for follow-up of her diarrhea and urgency.  This has been much better on a gluten-free diet.  Debra did treat her with Xifaxan at her last visit.  Her main GI complaint today is of increased gas and bloating.  She denies any artificial sweeteners.  She does take a probiotic daily.  She does not feel that she is constipated.  She is unsure if the Xifaxan helped significantly with the gas and bloating, but it has gotten worse since her last visit.  Overall, she is feeling well today. 11/19/2021 The patient presents today for follow-up of her diarrhea, bloating, and urgency.  Since her last visit, she has been doing fairly well.  She did take another round of Xifaxan, but she was unable to finish it.  It made her feel funny.  Her gas and bloating have improved, but she continues to have occasional diarrhea with incontinence.  She was told by her urologist that she does have a rectocele.  She is taking psyllium daily, but she has not tried colestipol or cholestyramine.  We have discussed starting this today, and she is willing.  Overall, she is feeling well today.

## 2022-03-18 ENCOUNTER — OFFICE VISIT (OUTPATIENT)
Dept: URBAN - NONMETROPOLITAN AREA CLINIC 13 | Facility: CLINIC | Age: 82
End: 2022-03-18

## 2022-03-18 RX ORDER — DILTIAZEM HYDROCHLORIDE 120 MG/1
TAKE 1 CAPSULE (120 MG) BY ORAL ROUTE ONCE DAILY CAPSULE, EXTENDED RELEASE ORAL 1
Qty: 0 | Refills: 0 | Status: ACTIVE | COMMUNITY
Start: 1900-01-01

## 2022-03-18 RX ORDER — BIFIDOBACTERIUM LONGUM 10MM CELL
TAKE 1 CAPSULE BY ORAL ROUTE DAILY CAPSULE ORAL 1
Qty: 0 | Refills: 0 | Status: ACTIVE | COMMUNITY
Start: 1900-01-01

## 2022-03-18 RX ORDER — SITAGLIPTIN AND METFORMIN HYDROCHLORIDE 50; 1000 MG/1; MG/1
TAKE 1 TABLET BY ORAL ROUTE 2 TIMES PER DAY WITH MEALS TABLET, FILM COATED ORAL 2
Qty: 0 | Refills: 0 | Status: ACTIVE | COMMUNITY
Start: 1900-01-01

## 2022-03-18 RX ORDER — LOSARTAN POTASSIUM 25 MG/1
TAKE 1 TABLET (25 MG) BY ORAL ROUTE ONCE DAILY TABLET, FILM COATED ORAL 1
Qty: 0 | Refills: 0 | Status: ACTIVE | COMMUNITY
Start: 1900-01-01

## 2022-03-18 RX ORDER — COLESTIPOL HYDROCHLORIDE 1 G/1
1 TABLET TABLET, FILM COATED ORAL TWICE A DAY
Qty: 60 TABLET | Refills: 6 | Status: ACTIVE | COMMUNITY
Start: 2021-11-19

## 2022-03-18 RX ORDER — SIMVASTATIN 10 MG/1
TAKE 1 TABLET (10 MG) BY ORAL ROUTE ONCE DAILY IN THE EVENING TABLET, FILM COATED ORAL 1
Qty: 0 | Refills: 0 | Status: ACTIVE | COMMUNITY
Start: 1900-01-01

## 2022-03-18 RX ORDER — OXYBUTYNIN CHLORIDE 10 MG/1
TAKE 1 TABLET (10 MG) BY ORAL ROUTE ONCE DAILY TABLET, EXTENDED RELEASE ORAL 1
Qty: 0 | Refills: 0 | Status: ACTIVE | COMMUNITY
Start: 1900-01-01

## 2022-03-18 RX ORDER — ATENOLOL 50 MG/1
TAKE 1 TABLET (50 MG) BY ORAL ROUTE ONCE DAILY TABLET ORAL 1
Qty: 0 | Refills: 0 | Status: ACTIVE | COMMUNITY
Start: 1900-01-01

## 2022-03-18 RX ORDER — TRIAMTERENE AND HYDROCHLOROTHIAZIDE 37.5; 25 MG/1; MG/1
TABLET ORAL
Qty: 0 | Refills: 0 | Status: ACTIVE | COMMUNITY
Start: 1900-01-01

## 2022-03-18 RX ORDER — WARFARIN 5 MG/1
TABLET ORAL
Qty: 0 | Refills: 0 | Status: ACTIVE | COMMUNITY
Start: 1900-01-01

## 2022-08-04 ENCOUNTER — OFFICE VISIT (OUTPATIENT)
Dept: URBAN - NONMETROPOLITAN AREA CLINIC 2 | Facility: CLINIC | Age: 82
End: 2022-08-04

## 2023-02-08 ENCOUNTER — WEB ENCOUNTER (OUTPATIENT)
Dept: URBAN - NONMETROPOLITAN AREA CLINIC 13 | Facility: CLINIC | Age: 83
End: 2023-02-08

## 2023-02-08 ENCOUNTER — DASHBOARD ENCOUNTERS (OUTPATIENT)
Age: 83
End: 2023-02-08

## 2023-02-08 ENCOUNTER — OFFICE VISIT (OUTPATIENT)
Dept: URBAN - NONMETROPOLITAN AREA CLINIC 13 | Facility: CLINIC | Age: 83
End: 2023-02-08
Payer: COMMERCIAL

## 2023-02-08 VITALS
BODY MASS INDEX: 23.76 KG/M2 | HEART RATE: 60 BPM | HEIGHT: 65 IN | SYSTOLIC BLOOD PRESSURE: 165 MMHG | DIASTOLIC BLOOD PRESSURE: 69 MMHG | TEMPERATURE: 98.3 F | WEIGHT: 142.6 LBS

## 2023-02-08 DIAGNOSIS — Z12.11 COLON CANCER SCREENING: ICD-10-CM

## 2023-02-08 DIAGNOSIS — R19.8 ALTERNATING CONSTIPATION AND DIARRHEA: ICD-10-CM

## 2023-02-08 DIAGNOSIS — K57.90 DIVERTICULOSIS: ICD-10-CM

## 2023-02-08 DIAGNOSIS — K21.9 GASTROESOPHAGEAL REFLUX DISEASE, UNSPECIFIED WHETHER ESOPHAGITIS PRESENT: ICD-10-CM

## 2023-02-08 DIAGNOSIS — R89.4 ABNORMAL CELIAC ANTIBODY PANEL: ICD-10-CM

## 2023-02-08 DIAGNOSIS — R14.0 BLOATING: ICD-10-CM

## 2023-02-08 PROBLEM — 166166006: Status: ACTIVE | Noted: 2023-02-08

## 2023-02-08 PROBLEM — 116289008: Status: ACTIVE | Noted: 2020-06-23

## 2023-02-08 PROBLEM — 397881000: Status: ACTIVE | Noted: 2023-02-08

## 2023-02-08 PROBLEM — 235595009: Status: ACTIVE | Noted: 2023-02-08

## 2023-02-08 PROBLEM — 249517009: Status: ACTIVE | Noted: 2023-02-08

## 2023-02-08 PROCEDURE — 99214 OFFICE O/P EST MOD 30 MIN: CPT | Performed by: NURSE PRACTITIONER

## 2023-02-08 RX ORDER — ATENOLOL 50 MG/1
TAKE 1 TABLET (50 MG) BY ORAL ROUTE ONCE DAILY TABLET ORAL 1
Qty: 0 | Refills: 0 | Status: ACTIVE | COMMUNITY
Start: 1900-01-01

## 2023-02-08 RX ORDER — PANTOPRAZOLE 20 MG/1
TAKE 1 TABLET BY MOUTH IN THE MORNING TABLET, DELAYED RELEASE ORAL
Qty: 30 EACH | Refills: 0 | Status: ON HOLD | COMMUNITY

## 2023-02-08 RX ORDER — SITAGLIPTIN AND METFORMIN HYDROCHLORIDE 50; 1000 MG/1; MG/1
TAKE 1 TABLET BY ORAL ROUTE 2 TIMES PER DAY WITH MEALS TABLET, FILM COATED ORAL 2
Qty: 0 | Refills: 0 | Status: ACTIVE | COMMUNITY
Start: 1900-01-01

## 2023-02-08 RX ORDER — OXYBUTYNIN CHLORIDE 10 MG/1
TAKE 1 TABLET (10 MG) BY ORAL ROUTE ONCE DAILY TABLET, EXTENDED RELEASE ORAL 1
Qty: 0 | Refills: 0 | Status: ACTIVE | COMMUNITY
Start: 1900-01-01

## 2023-02-08 RX ORDER — ISOSORBIDE MONONITRATE 30 MG/1
TAKE 1 TABLET BY MOUTH IN THE MORNING TABLET, EXTENDED RELEASE ORAL
Qty: 30 EACH | Refills: 0 | Status: ACTIVE | COMMUNITY

## 2023-02-08 RX ORDER — LOSARTAN POTASSIUM 25 MG/1
TAKE 1 TABLET (25 MG) BY ORAL ROUTE ONCE DAILY TABLET, FILM COATED ORAL 1
Qty: 0 | Refills: 0 | Status: ACTIVE | COMMUNITY
Start: 1900-01-01

## 2023-02-08 RX ORDER — SIMVASTATIN 10 MG/1
TAKE 1 TABLET (10 MG) BY ORAL ROUTE ONCE DAILY IN THE EVENING TABLET, FILM COATED ORAL 1
Qty: 0 | Refills: 0 | Status: ACTIVE | COMMUNITY
Start: 1900-01-01

## 2023-02-08 RX ORDER — BIFIDOBACTERIUM LONGUM 10MM CELL
TAKE 1 CAPSULE BY ORAL ROUTE DAILY CAPSULE ORAL 1
Qty: 0 | Refills: 0 | Status: ACTIVE | COMMUNITY
Start: 1900-01-01

## 2023-02-08 RX ORDER — WARFARIN 5 MG/1
TABLET ORAL
Qty: 0 | Refills: 0 | Status: ACTIVE | COMMUNITY
Start: 1900-01-01

## 2023-02-08 RX ORDER — COLESTIPOL HYDROCHLORIDE 1 G/1
1 TABLET TABLET, FILM COATED ORAL TWICE A DAY
Qty: 60 TABLET | Refills: 6 | Status: ON HOLD | COMMUNITY
Start: 2021-11-19

## 2023-02-08 RX ORDER — TRIAMTERENE AND HYDROCHLOROTHIAZIDE 37.5; 25 MG/1; MG/1
TABLET ORAL
Qty: 0 | Refills: 0 | Status: ACTIVE | COMMUNITY
Start: 1900-01-01

## 2023-02-08 RX ORDER — DILTIAZEM HYDROCHLORIDE 120 MG/1
TAKE 1 CAPSULE (120 MG) BY ORAL ROUTE ONCE DAILY CAPSULE, EXTENDED RELEASE ORAL 1
Qty: 0 | Refills: 0 | Status: ACTIVE | COMMUNITY
Start: 1900-01-01

## 2023-02-08 NOTE — HPI-TODAY'S VISIT:
12/19/19  Mrs. Pleitez presents for endoscopy follow up. Her labs show elevated celiac titer. Her EGD shows normal SBBX and her colonoscopy reveals normal random biopsies and diverticular disease. She is on low dose metamucil and align daily, she thinks this works better than the florastor. She still has urgency and diarrhea with bloating. She has failed immodium in the past. She does want to try a gluten free diet given her history. Today she is doing well otherwise. MB   6/23/20 The patient presents today for follow-up of her diarrhea and urgency.  This has been much better on a gluten-free diet.  Debra did treat her with Xifaxan at her last visit.  Her main GI complaint today is of increased gas and bloating.  She denies any artificial sweeteners.  She does take a probiotic daily.  She does not feel that she is constipated.  She is unsure if the Xifaxan helped significantly with the gas and bloating, but it has gotten worse since her last visit.  Overall, she is feeling well today.  11/19/2021 The patient presents today for follow-up of her diarrhea, bloating, and urgency.  Since her last visit, she has been doing fairly well.  She did take another round of Xifaxan, but she was unable to finish it.  It made her feel funny.  Her gas and bloating have improved, but she continues to have occasional diarrhea with incontinence.  She was told by her urologist that she does have a rectocele.  She is taking psyllium daily, but she has not tried colestipol or cholestyramine.  We have discussed starting this today, and she is willing.  Overall, she is feeling well today. 2/8/2023 Normal presents for follow-up of history of reflux, gluten sensitivity, and alternating constipation and diarrhea.  She is doing well today.  She is off PPI.  She denies any heartburn symptoms.  Her bowels and bloating seem to improve off gluten.  She remains on a gluten-free diet.  Her TTG IgG was elevated to 12 in 2019, her EGD shows normal small bowel biopsies.  She is on 3 capsules of fiber twice daily and MiraLAX as needed.  Her bowels are moving fairly well.  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  MB

## 2023-02-13 ENCOUNTER — TELEPHONE ENCOUNTER (OUTPATIENT)
Dept: URBAN - METROPOLITAN AREA CLINIC 92 | Facility: CLINIC | Age: 83
End: 2023-02-13

## 2025-03-12 ENCOUNTER — APPOINTMENT (OUTPATIENT)
Dept: URBAN - NONMETROPOLITAN AREA CLINIC 45 | Age: 85
Setting detail: DERMATOLOGY
End: 2025-03-16

## 2025-03-12 DIAGNOSIS — L57.0 ACTINIC KERATOSIS: ICD-10-CM

## 2025-03-12 DIAGNOSIS — L82.0 INFLAMED SEBORRHEIC KERATOSIS: ICD-10-CM

## 2025-03-12 DIAGNOSIS — L82.1 OTHER SEBORRHEIC KERATOSIS: ICD-10-CM

## 2025-03-12 PROCEDURE — 17000 DESTRUCT PREMALG LESION: CPT | Mod: 59

## 2025-03-12 PROCEDURE — 17110 DESTRUCT B9 LESION 1-14: CPT

## 2025-03-12 PROCEDURE — OTHER MIPS QUALITY: OTHER

## 2025-03-12 PROCEDURE — 17003 DESTRUCT PREMALG LES 2-14: CPT | Mod: 59

## 2025-03-12 PROCEDURE — 99202 OFFICE O/P NEW SF 15 MIN: CPT | Mod: 25

## 2025-03-12 PROCEDURE — OTHER COUNSELING: OTHER

## 2025-03-12 PROCEDURE — OTHER LIQUID NITROGEN: OTHER

## 2025-03-12 ASSESSMENT — LOCATION SIMPLE DESCRIPTION DERM
LOCATION SIMPLE: RIGHT TEMPLE
LOCATION SIMPLE: LEFT BREAST
LOCATION SIMPLE: RIGHT SHOULDER
LOCATION SIMPLE: RIGHT HAND
LOCATION SIMPLE: RIGHT BREAST

## 2025-03-12 ASSESSMENT — LOCATION ZONE DERM
LOCATION ZONE: FACE
LOCATION ZONE: TRUNK
LOCATION ZONE: ARM
LOCATION ZONE: HAND

## 2025-03-12 ASSESSMENT — LOCATION DETAILED DESCRIPTION DERM
LOCATION DETAILED: RIGHT ANTERIOR SHOULDER
LOCATION DETAILED: LEFT MEDIAL BREAST 6-7:00 REGION
LOCATION DETAILED: RIGHT RADIAL DORSAL HAND
LOCATION DETAILED: RIGHT CENTRAL TEMPLE
LOCATION DETAILED: RIGHT MEDIAL BREAST 5-6:00 REGION

## 2025-03-12 NOTE — PROCEDURE: LIQUID NITROGEN
Number Of Freeze-Thaw Cycles: 3 freeze-thaw cycles
Show Aperture Variable?: Yes
Render Note In Bullet Format When Appropriate: No
Medical Necessity Information: It is in your best interest to select a reason for this procedure from the list below. All of these items fulfill various CMS LCD requirements except the new and changing color options.
Medical Necessity Clause: This procedure was medically necessary because the lesions that were treated were:
Detail Level: Detailed
Post-Care Instructions: I reviewed with the patient in detail post-care instructions. Patient is to wear sunprotection, and avoid picking at any of the treated lesions. Pt may apply Vaseline to crusted or scabbing areas.
Spray Paint Text: The liquid nitrogen was applied to the skin utilizing a spray paint frosting technique.
Consent: The patient's verbal consent was obtained including but not limited to risks of crusting, scabbing, blistering, scarring, darker or lighter pigmentary change, recurrence, incomplete removal and infection.
Application Tool (Optional): Liquid Nitrogen Sprayer
Duration Of Freeze Thaw-Cycle (Seconds): 0